# Patient Record
Sex: MALE | URBAN - METROPOLITAN AREA
[De-identification: names, ages, dates, MRNs, and addresses within clinical notes are randomized per-mention and may not be internally consistent; named-entity substitution may affect disease eponyms.]

---

## 2021-03-25 ENCOUNTER — APPOINTMENT (RX ONLY)
Dept: URBAN - METROPOLITAN AREA CLINIC 120 | Facility: CLINIC | Age: 86
Setting detail: DERMATOLOGY
End: 2021-03-25

## 2021-03-25 DIAGNOSIS — L29.89 OTHER PRURITUS: ICD-10-CM

## 2021-03-25 DIAGNOSIS — R23.3 SPONTANEOUS ECCHYMOSES: ICD-10-CM

## 2021-03-25 DIAGNOSIS — L29.8 OTHER PRURITUS: ICD-10-CM

## 2021-03-25 PROCEDURE — ? TREATMENT REGIMEN

## 2021-03-25 PROCEDURE — ? ADDITIONAL NOTES

## 2021-03-25 PROCEDURE — ? PRESCRIPTION

## 2021-03-25 PROCEDURE — 99203 OFFICE O/P NEW LOW 30 MIN: CPT

## 2021-03-25 RX ORDER — GABAPENTIN 100 MG/1
CAPSULE ORAL
Qty: 30 | Refills: 2 | Status: CANCELLED | COMMUNITY
Start: 2021-03-25

## 2021-03-25 RX ADMIN — GABAPENTIN: 100 CAPSULE ORAL at 00:00

## 2021-03-25 ASSESSMENT — LOCATION SIMPLE DESCRIPTION DERM
LOCATION SIMPLE: LEFT UPPER ARM
LOCATION SIMPLE: RIGHT UPPER ARM
LOCATION SIMPLE: LEFT UPPER BACK

## 2021-03-25 ASSESSMENT — LOCATION ZONE DERM
LOCATION ZONE: TRUNK
LOCATION ZONE: ARM

## 2021-03-25 ASSESSMENT — LOCATION DETAILED DESCRIPTION DERM
LOCATION DETAILED: LEFT ANTERIOR DISTAL UPPER ARM
LOCATION DETAILED: LEFT INFERIOR UPPER BACK
LOCATION DETAILED: RIGHT ANTERIOR DISTAL UPPER ARM

## 2021-03-25 NOTE — PROCEDURE: TREATMENT REGIMEN
Initiate Treatment: gabapentin 100 mg capsule \\nSig: Take 2 pills (200mg) after dialysis M,W, F. Counseled about sedation.
Otc Regimen: Take short lukewarm showers (pt takes hot showers every other day)\\nContinue using Dove bar soap
Samples Given: Cerave anti-itch cream with pramoxine: use BID on entire body and additionally if necessary
Detail Level: Zone
Plan: Will defer topical steroids since pt has thin skin and significant bruising already

## 2021-03-25 NOTE — PROCEDURE: ADDITIONAL NOTES
Render Risk Assessment In Note?: no
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 Pt expressed understanding. He says he gets routine blood work which has not shown any abnormalities.
Detail Level: Simple

## 2021-05-06 ENCOUNTER — APPOINTMENT (RX ONLY)
Dept: URBAN - METROPOLITAN AREA CLINIC 120 | Facility: CLINIC | Age: 86
Setting detail: DERMATOLOGY
End: 2021-05-06

## 2021-05-06 DIAGNOSIS — B00.1 HERPESVIRAL VESICULAR DERMATITIS: ICD-10-CM

## 2021-05-06 PROCEDURE — ? TREATMENT REGIMEN

## 2021-05-06 PROCEDURE — ? DIAGNOSIS COMMENT

## 2021-05-06 PROCEDURE — ? PRESCRIPTION

## 2021-05-06 PROCEDURE — 99213 OFFICE O/P EST LOW 20 MIN: CPT

## 2021-05-06 RX ORDER — VALACYCLOVIR HYDROCHLORIDE 1 G/1
TABLET, FILM COATED ORAL DAILY
Qty: 5 | Refills: 3 | Status: ERX | COMMUNITY
Start: 2021-05-06

## 2021-05-06 RX ORDER — SILVER SULFADIAZINE 10 MG/G
CREAM TOPICAL
Qty: 1 | Refills: 2 | Status: ERX | COMMUNITY
Start: 2021-05-06

## 2021-05-06 RX ADMIN — SILVER SULFADIAZINE: 10 CREAM TOPICAL at 00:00

## 2021-05-06 RX ADMIN — VALACYCLOVIR HYDROCHLORIDE: 1 TABLET, FILM COATED ORAL at 00:00

## 2021-05-06 NOTE — PROCEDURE: TREATMENT REGIMEN
Detail Level: Zone
Initiate Treatment: Will tx as HSV\\nLesions have healed and testing will be of low yield\\nStart Valtrex 1g PO daily x 5 days at onset of flare
Continue Regimen: Pt also requesting Silver Sulfadiazine cream as he finds it soothing. Will give refills. Initially prescribed by another dermatologist.

## 2021-05-06 NOTE — PROCEDURE: DIAGNOSIS COMMENT
Detail Level: Simple
Comment: Suspect HSV given hx of recurrences in same area, pain and hx of blisters.
Render Risk Assessment In Note?: no

## 2021-06-24 ENCOUNTER — APPOINTMENT (RX ONLY)
Dept: URBAN - METROPOLITAN AREA CLINIC 120 | Facility: CLINIC | Age: 86
Setting detail: DERMATOLOGY
End: 2021-06-24

## 2021-06-24 DIAGNOSIS — B00.1 HERPESVIRAL VESICULAR DERMATITIS: ICD-10-CM

## 2021-06-24 PROCEDURE — ? DIAGNOSIS COMMENT

## 2021-06-24 PROCEDURE — ? TREATMENT REGIMEN

## 2021-06-24 PROCEDURE — ? PRESCRIPTION

## 2021-06-24 PROCEDURE — 99212 OFFICE O/P EST SF 10 MIN: CPT

## 2021-06-24 RX ORDER — SILVER SULFADIAZINE 10 MG/G
CREAM TOPICAL
Qty: 1 | Refills: 2 | Status: CANCELLED

## 2021-06-24 RX ORDER — VALACYCLOVIR HYDROCHLORIDE 1 G/1
TABLET, FILM COATED ORAL DAILY
Qty: 5 | Refills: 3 | Status: ERX

## 2021-06-24 NOTE — PROCEDURE: TREATMENT REGIMEN
Detail Level: Zone
Initiate Treatment: Will tx as HSV\\nLesions have healed and testing will be of low yield\\nStart Valtrex 1g PO daily x 5 days at onset of flare\\n**Prescription was eprescribed again today. Pt notified to call if any issues getting it. Given name of medication on paper.
Plan: Will keep f/u PRN since transportation and mobility is very difficult for patient.
Continue Regimen: May continue Silver sulfadiazine as he finds it beneficial